# Patient Record
Sex: FEMALE
[De-identification: names, ages, dates, MRNs, and addresses within clinical notes are randomized per-mention and may not be internally consistent; named-entity substitution may affect disease eponyms.]

---

## 2020-05-14 PROBLEM — Z00.00 ENCOUNTER FOR PREVENTIVE HEALTH EXAMINATION: Status: ACTIVE | Noted: 2020-05-14

## 2020-05-15 ENCOUNTER — APPOINTMENT (OUTPATIENT)
Dept: ORTHOPEDIC SURGERY | Facility: CLINIC | Age: 65
End: 2020-05-15
Payer: MEDICARE

## 2020-05-15 DIAGNOSIS — Z80.8 FAMILY HISTORY OF MALIGNANT NEOPLASM OF OTHER ORGANS OR SYSTEMS: ICD-10-CM

## 2020-05-15 DIAGNOSIS — G43.909 MIGRAINE, UNSPECIFIED, NOT INTRACTABLE, W/OUT STATUS MIGRAINOSUS: ICD-10-CM

## 2020-05-15 DIAGNOSIS — M75.02 ADHESIVE CAPSULITIS OF LEFT SHOULDER: ICD-10-CM

## 2020-05-15 DIAGNOSIS — M25.512 PAIN IN LEFT SHOULDER: ICD-10-CM

## 2020-05-15 DIAGNOSIS — Z78.9 OTHER SPECIFIED HEALTH STATUS: ICD-10-CM

## 2020-05-15 DIAGNOSIS — Z80.0 FAMILY HISTORY OF MALIGNANT NEOPLASM OF DIGESTIVE ORGANS: ICD-10-CM

## 2020-05-15 DIAGNOSIS — M19.049 PRIMARY OSTEOARTHRITIS, UNSPECIFIED HAND: ICD-10-CM

## 2020-05-15 DIAGNOSIS — Z80.3 FAMILY HISTORY OF MALIGNANT NEOPLASM OF BREAST: ICD-10-CM

## 2020-05-15 PROCEDURE — 99203 OFFICE O/P NEW LOW 30 MIN: CPT | Mod: 95

## 2020-05-15 RX ORDER — ESTRADIOL 0.03 MG/D
0.03 PATCH, EXTENDED RELEASE TRANSDERMAL
Refills: 0 | Status: ACTIVE | COMMUNITY

## 2020-05-15 RX ORDER — PROGESTERONE 100 MG/1
100 CAPSULE ORAL
Refills: 0 | Status: ACTIVE | COMMUNITY

## 2020-05-15 RX ORDER — SUMATRIPTAN SUCCINATE AND NAPROXEN SODIUM 85; 500 MG/1; MG/1
85-500 TABLET, FILM COATED ORAL
Refills: 0 | Status: ACTIVE | COMMUNITY

## 2020-05-15 NOTE — ASSESSMENT
[FreeTextEntry1] : 66 yo woman with 2 mos Left shoulder pain that started without any specific trauma but she normally does a lot of lifting and physical activity. She appears to have painful , global loss of motion in the left shoulder which would be c/w adhesive capsulitis.\par Differential dx also includes RC tendinosis, bursitis, calcific tendinitis and osteoarthritis. We disc'd the possibilities and will need to see how her symptoms evolve over time\par I suggested first trying a course of Naproxyn 550 mg BID for 1 wk w/ food and then 1/day for another week and then take as needed.\par She will do home assisted ROM exercises, heat and ice. \par If it isn't getting better she can go for an x-ray or come into the office for x-ray and eval. \par We may consider MRI and / or injection. \par She will be given a PT script as well.\par

## 2020-05-15 NOTE — PHYSICAL EXAM
[Normal RUE] : Right Upper Extremity: No scars, rashes, lesions, ulcers, skin intact [UE] : Sensory: Intact in bilateral upper extremities [Normal LUE] : Left Upper Extremity: No scars, rashes, lesions, ulcers, skin intact [Normal Touch] : sensation intact for touch [Normal] : Oriented to person, place, and time, insight and judgement were intact and the affect was normal [de-identified] : Bilateral shoulders \par Cervical ROM is within normal limits and without pain.\par Normal appearance shoulders. \par AROM: 180 RT and 160 LT FE, IR to T6 RT and L3 LT , 180 RT and 150 LT abd.\par RT 90  LT 50 ER and 60 RT and 10 LT  IR in the 90 degree abducted position.\par LEFT + Neer, + Abebe. + X-arm.   Pain at extremes of all motion on the Left . No pain with ROM/movement RIGHT\par Full symmetric AROM elbows and hands.\par Sensation is intact distally in the UE.\par Skin is intact in the UE. \par Intact Motor distally.\par No edema, ecchymoses erythema [de-identified] : No respiratory distress or coughing

## 2020-05-17 ENCOUNTER — FORM ENCOUNTER (OUTPATIENT)
Age: 65
End: 2020-05-17

## 2021-05-28 ENCOUNTER — APPOINTMENT (OUTPATIENT)
Dept: ORTHOPEDIC SURGERY | Facility: CLINIC | Age: 66
End: 2021-05-28
Payer: MEDICARE

## 2021-05-28 PROCEDURE — 20610 DRAIN/INJ JOINT/BURSA W/O US: CPT | Mod: RT

## 2021-05-28 PROCEDURE — 73564 X-RAY EXAM KNEE 4 OR MORE: CPT | Mod: RT

## 2021-05-28 PROCEDURE — 99213 OFFICE O/P EST LOW 20 MIN: CPT | Mod: 25

## 2021-06-01 NOTE — PHYSICAL EXAM
[Normal RUE] : Right Upper Extremity: No scars, rashes, lesions, ulcers, skin intact [Normal LUE] : Left Upper Extremity: No scars, rashes, lesions, ulcers, skin intact [Normal Touch] : sensation intact for touch [Normal] : Oriented to person, place, and time, insight and judgement were intact and the affect was normal [LE] : Sensory: Intact in bilateral lower extremities [PT] : posterior tibial 2+ and symmetric bilaterally [Knee Swelling Right] : swelling [Knee Tenderness On Palpation Right] : tenderness [Knee Meniscal Integ Brijesh's Displace Test Right Lateral] : positive Brijesh's laterally [Knee Motion Left] : full ROM [Normal RLE] : Right Lower Extremity: No scars, rashes, lesions, ulcers, skin intact [Normal LLE] : Left Lower Extremity: No scars, rashes, lesions, ulcers, skin intact [Knee Motion Right Crepitus] : no crepitus with ROM [Knee Motion Right] : limited ROM [Knee Stability / Maneuvers] : negative patellofemoral apprehension test [Knee Anterior Drawer Sign Right] : negative anterior drawer sign [Knee Posterior Drawer Sign Right] : negative posterior drawer sign [Knee Meniscal Integ Brijesh's Displace Test Right Medial] : negative Brijesh's medially [Knee Instability Laxity Right Anterior Cruciate Ligament] : negative pivot shift test [Knee Medial Instability Right] : no laxity on valgus stress [Knee Lateral Instability Right] : no laxity on varus stress [Knee Swelling Left] : no swelling [Knee Tenderness On Palpation Left] : no tenderness [Knee Motion Left Crepitus] : no crepitus with ROM [Patellofemoral Apprehension Test Left] : negative patellofemoral apprehension test [Knee Anterior Drawer Sign Left] : negative anterior drawer sign [Knee Posterior Drawer Sign Left] : negative posterior drawer sign [Knee Meniscal Integ Brijesh's Displace Test Left Medial] : negative Brijesh's medially [Knee Meniscal Integ Brijesh's Displace Test Left Lateral] : negative Brijesh's laterally [Knee Tender On Palp With Quadriceps Contracted (Shrug Sign)] : negative patellar grind [Knee Instability Laxity Left Anterior Cruciate Ligament] : negative pivot shift test [Knee Medial Instability Left] : no laxity on valgus stress [Knee Lateral Instability Left] : no  laxity on varus stress [Obese] : not obese [FreeTextEntry2] : \par Right knee range of motion is with a 5 to 10 degree flexion contracture with pain on attempted extension and flexion to about 110 to 115 degrees with pain on further flexion.\par Left knee motion is from 0 to 130 degrees.\par Tender lateral joint line and more so on the patella facets of the right knee.\par 1+ effusion. [de-identified] : No respiratory distress or coughing [de-identified] : \par X-rays right knee weightbearing 4 views were ordered and performed today.  They were reviewed with the patient showing mild patellofemoral osteoarthritis.  Medial and lateral compartments well-maintained.  No fractures.  There may be some subtle subchondral lucency across the lateral femoral condyle

## 2021-06-01 NOTE — PROCEDURE
[Aspiration] : Aspiration [Injection] : Injection [Right] : of the right [Knee Joint] : knee joint [Effusion] : Effusion [Osteoarthritis] : Osteoarthritis [Joint Pain] : Joint pain [Patient] : patient [Risk] : risk [Benefits] : benefits [Alternatives] : alternatives [Bleeding] : bleeding [Infection] : infection [Allergic Reaction] : allergic reaction [Verbal Consent Obtained] : verbal consent was obtained prior to the procedure [Alcohol] : Alcohol [Betadine] : Betadine [Ethyl Chloride Spray] : ethyl chloride spray was used as a topical anesthetic [Lateral] : lateral [20] : a 20-gauge [___ mL Fluid] : [unfilled] mL of [Same Needle/New Syringe] : the syringe was changed and the same needle was left in place and [1% Lidocaine___(mL)] : [unfilled] mL of 1% Lidocaine [Methylpred. 40mg/mL___(mL)] : [unfilled] mL of 40mg/mL methylprednisolone [Bandage Applied] : a bandage [Tolerated Well] : The patient tolerated the procedure well [None] : none [No Strenuous Activity___day(s)] : avoid strenuous activity for [unfilled] day(s) [___ Week(s)] : in [unfilled] week(s) [de-identified] : serosanguinous

## 2021-06-01 NOTE — ASSESSMENT
[FreeTextEntry1] : 67 yo with acute right knee pain over the last 4 weeks or so.  There is swelling.  There was no specific trauma but perhaps a lot of overuse.\par I recommended aspiration and steroid injection today since she cannot take NSAIDs and pain is severe and persisting.\par We did that and after the injection she had good partial pain relief and could extend the knee almost fully and could flex further to about 125 degrees.  There is still some pain at extremes of motion.  She could also walk better.  She was given a cane to assist in ambulation and keep stress off the knee.  She was given home exercises to do that are gentle but strengthening.  She should do physical therapy.  Ice regularly and Tylenol.\par If it is not getting better she will go for an MRI in a few weeks.\par Follow-up in about 4 weeks to check and make sure it is improving.\par If she has any bad reaction to the steroid she should let me know.  Occasionally patients have a steroid flare for a day afterwards I warned her of.

## 2021-06-01 NOTE — HISTORY OF PRESENT ILLNESS
[de-identified] : Ms. 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  She just noticed pain in the knee without any injury but she had been very busy and doing a lot of work on her feet for her business.\par She tried to rest more and wore a knee sleeve and took Tylenol up to 4/day.  Pain can be severe, 8 out of 10 in severity.  Walking barefoot makes it worse.  She has been limping.  There is pain at night.  Pain started radiating up the right lateral thigh to the lateral hip area.  Occasionally it radiates distally.  No prior knee issues.\par He states that her knee did go straight before this episode of pain.\par I had seen her with pain in her shoulder a year ago virtually.  She went for physical therapy.  It got better quickly.

## 2021-08-02 ENCOUNTER — NON-APPOINTMENT (OUTPATIENT)
Age: 66
End: 2021-08-02

## 2021-08-03 ENCOUNTER — APPOINTMENT (OUTPATIENT)
Dept: ORTHOPEDIC SURGERY | Facility: CLINIC | Age: 66
End: 2021-08-03
Payer: MEDICARE

## 2021-08-03 DIAGNOSIS — M25.461 EFFUSION, RIGHT KNEE: ICD-10-CM

## 2021-08-03 DIAGNOSIS — S83.281A OTHER TEAR OF LATERAL MENISCUS, CURRENT INJURY, RIGHT KNEE, INITIAL ENCOUNTER: ICD-10-CM

## 2021-08-03 PROCEDURE — 99213 OFFICE O/P EST LOW 20 MIN: CPT

## 2021-08-03 NOTE — ASSESSMENT
[FreeTextEntry1] : 66-year-old with right knee pain who has moderate patellofemoral arthritis and had edema in the lateral femoral condyle consistent with an insufficiency fracture and bone bruising medially.\par I recommended rest and walking with a cane limiting her weightbearing activity.  She can do straight leg raises.  Heat and ice.  She should take calcium and vitamin D.  We could consider an  brace but given the medial edema I think that may actually aggravate the contralateral side and would try to avoid an .\par She should work on full extension exercises.  If she still has pain walking with a cane in another week then a walker or crutches would be helpful.  She should not carry any heavy bags or objects.  Her knee is already looking better compared to the last visit with less swelling and pain.\par Follow-up in about 3 to 4 weeks.

## 2021-08-03 NOTE — HISTORY OF PRESENT ILLNESS
[de-identified] : Ms. Dejesus comes in for f/u for pain in her right knee that started a couple mos ago without any injury but she is active. \par She had the MRI. \par She has been walking with a cane.  She has been really resting especially of the last several days which has helped somewhat.  Line she started medication for the osteoporosis.

## 2021-08-03 NOTE — PHYSICAL EXAM
[LE] : Sensory: Intact in bilateral lower extremities [PT] : posterior tibial 2+ and symmetric bilaterally [Knee Swelling Right] : swelling [Knee Tenderness On Palpation Right] : tenderness [Knee Motion Left] : full ROM [Normal RUE] : Right Upper Extremity: No scars, rashes, lesions, ulcers, skin intact [Normal LUE] : Left Upper Extremity: No scars, rashes, lesions, ulcers, skin intact [Normal RLE] : Right Lower Extremity: No scars, rashes, lesions, ulcers, skin intact [Normal LLE] : Left Lower Extremity: No scars, rashes, lesions, ulcers, skin intact [Normal Touch] : sensation intact for touch [Normal] : Oriented to person, place, and time, insight and judgement were intact and the affect was normal [Knee Motion Right Crepitus] : no crepitus with ROM [Knee Motion Right] : limited ROM [Knee Stability / Maneuvers] : negative patellofemoral apprehension test [Knee Anterior Drawer Sign Right] : negative anterior drawer sign [Knee Posterior Drawer Sign Right] : negative posterior drawer sign [Knee Meniscal Integ Brijesh's Displace Test Right Medial] : negative Brijesh's medially [Knee Meniscal Integ Brijesh's Displace Test Right Lateral] : negative Brijesh's laterally [Knee Instability Laxity Right Anterior Cruciate Ligament] : negative pivot shift test [Knee Medial Instability Right] : no laxity on valgus stress [Knee Lateral Instability Right] : no laxity on varus stress [Knee Swelling Left] : no swelling [Knee Tenderness On Palpation Left] : no tenderness [Knee Motion Left Crepitus] : no crepitus with ROM [Patellofemoral Apprehension Test Left] : negative patellofemoral apprehension test [Knee Anterior Drawer Sign Left] : negative anterior drawer sign [Knee Posterior Drawer Sign Left] : negative posterior drawer sign [Knee Meniscal Integ Brijesh's Displace Test Left Medial] : negative Brijesh's medially [Knee Meniscal Integ Brijesh's Displace Test Left Lateral] : negative Brijesh's laterally [Knee Tender On Palp With Quadriceps Contracted (Shrug Sign)] : negative patellar grind [Knee Instability Laxity Left Anterior Cruciate Ligament] : negative pivot shift test [Knee Medial Instability Left] : no laxity on valgus stress [Knee Lateral Instability Left] : no  laxity on varus stress [Obese] : not obese [de-identified] : Initially when walking she was walking on a flexed knee and quite awkwardly.  I encouraged her to try to walk normally but use the cane to get a little pressure off and then she walked better. [FreeTextEntry2] : \par Right knee range of motion is with a 5  degree flexion contracture with mild but less pain on attempted extension and flexion to about 125 degrees with pain on further flexion.\par Left knee motion is from 0 to 130 degrees.\par Tender mildly  lateral joint line and more so on the patella facets of the right knee.\par trace+ effusion. [de-identified] : No respiratory distress or coughing [de-identified] : \par X-rays right knee weightbearing 4 views were done May 28, 2021  showing mild patellofemoral osteoarthritis.  Medial and lateral compartments well-maintained.  No fractures.  There may be some subtle subchondral lucency across the lateral femoral condyle\par \par AP and lateral x-rays of the right knee today showed\par \par MRI of the right knee performed July 16, 2021 showed edema in the lateral femoral condyle consistent with an insufficiency fracture central to posterior .\par There was broad full-thickness cartilage loss in the central medial patella facet and degenerative subchondral bone marrow edema and high-grade thinning of the cartilage inferior and lateral.\par There is a mild bone contusion medial femoral condyle.

## 2021-09-01 ENCOUNTER — APPOINTMENT (OUTPATIENT)
Dept: ORTHOPEDIC SURGERY | Facility: CLINIC | Age: 66
End: 2021-09-01
Payer: MEDICARE

## 2021-09-01 DIAGNOSIS — M84.451A PATHOLOGICAL FRACTURE, RIGHT FEMUR, INITIAL ENCOUNTER FOR FRACTURE: ICD-10-CM

## 2021-09-01 DIAGNOSIS — M17.11 UNILATERAL PRIMARY OSTEOARTHRITIS, RIGHT KNEE: ICD-10-CM

## 2021-09-01 PROCEDURE — 99213 OFFICE O/P EST LOW 20 MIN: CPT

## 2021-09-01 PROCEDURE — 73564 X-RAY EXAM KNEE 4 OR MORE: CPT | Mod: LT

## 2021-09-01 NOTE — ASSESSMENT
[FreeTextEntry1] : 66-year-old with right knee pain who has moderate patellofemoral arthritis and had edema in the lateral femoral condyle consistent with an insufficiency fracture and bone bruising medially.\par Her knee pain has decreased considerably with the decrease in activity and not aggravating it.  She should continue to use a cane.  She may be able to do a little more activity but should still keep it down to just which she needs to do.  She should continue to do leg lifts and nonweightbearing exercises to keep the muscle strong.  She is seeing an endocrinologist for treatment of the osteoporosis and is going to be starting on medication for the osteoporosis.\par Talked about and  brace is another option but may be uncomfortable.  She does not have any significant valgus in her knee.\par Follow-up in about 4 weeks

## 2021-09-01 NOTE — HISTORY OF PRESENT ILLNESS
[de-identified] : Ms. Dejesus comes in for f/u for pain in her right knee that started a couple mos ago without any injury but she is active. \par She had an MRI 7/16/21 showing insufficiency fx MFC\par She has been walking with a cane.  She has continued to minimize her activities and not do too much lifting or carrying.  Pain is nowhere near what it was when I had seen her initially but has not quite gone away completely either.  She has not been taking any medication for pain.

## 2021-09-01 NOTE — PHYSICAL EXAM
[LE] : Sensory: Intact in bilateral lower extremities [PT] : posterior tibial 2+ and symmetric bilaterally [Knee Tenderness On Palpation Right] : tenderness [Knee Motion Left] : full ROM [Normal RUE] : Right Upper Extremity: No scars, rashes, lesions, ulcers, skin intact [Normal LUE] : Left Upper Extremity: No scars, rashes, lesions, ulcers, skin intact [Normal RLE] : Right Lower Extremity: No scars, rashes, lesions, ulcers, skin intact [Normal LLE] : Left Lower Extremity: No scars, rashes, lesions, ulcers, skin intact [Normal Touch] : sensation intact for touch [Normal] : Gait: normal [Knee Swelling Right] : no swelling [Knee Motion Right Crepitus] : no crepitus with ROM [Knee Motion Right] : limited ROM [Knee Stability / Maneuvers] : negative patellofemoral apprehension test [Knee Anterior Drawer Sign Right] : negative anterior drawer sign [Knee Posterior Drawer Sign Right] : negative posterior drawer sign [Knee Meniscal Integ Brijesh's Displace Test Right Medial] : negative Brijesh's medially [Knee Meniscal Integ Brijesh's Displace Test Right Lateral] : negative Brijesh's laterally [Knee Instability Laxity Right Anterior Cruciate Ligament] : negative pivot shift test [Knee Medial Instability Right] : no laxity on valgus stress [Knee Lateral Instability Right] : no laxity on varus stress [Knee Swelling Left] : no swelling [Knee Tenderness On Palpation Left] : no tenderness [Knee Motion Left Crepitus] : no crepitus with ROM [Patellofemoral Apprehension Test Left] : negative patellofemoral apprehension test [Knee Anterior Drawer Sign Left] : negative anterior drawer sign [Knee Posterior Drawer Sign Left] : negative posterior drawer sign [Knee Meniscal Integ Brijesh's Displace Test Left Medial] : negative Brijesh's medially [Knee Meniscal Integ Brijesh's Displace Test Left Lateral] : negative Brijesh's laterally [Knee Tender On Palp With Quadriceps Contracted (Shrug Sign)] : negative patellar grind [Knee Instability Laxity Left Anterior Cruciate Ligament] : negative pivot shift test [Knee Medial Instability Left] : no laxity on valgus stress [Knee Lateral Instability Left] : no  laxity on varus stress [Obese] : not obese [FreeTextEntry2] : \par Right knee range of motion is with a 2  degree flexion contracture  to about 125-130 degrees  flexion with discomfort on full flexion but improved\par Left knee motion is from 0 to 130 degrees.\par Tender mildly  lateral joint line and more so on the patella facets of the right knee.\par - effusion. [de-identified] : No respiratory distress or coughing [de-identified] : \par X-rays right knee weightbearing 4 views were done May 28, 2021 showing mild patellofemoral osteoarthritis.  Medial and lateral compartments well-maintained.  No fractures.  There may be some subtle subchondral lucency across the lateral femoral condyle\par \par  x-rays of the right knee WB 4 views today do not show any fracture but there appears to be some osteopenia in the lateral femoral condyle.  No collapse or fracture\par \par MRI of the right knee performed July 16, 2021 showed edema in the lateral femoral condyle consistent with an insufficiency fracture central to posterior .\par There was broad full-thickness cartilage loss in the central medial patella facet and degenerative subchondral bone marrow edema and high-grade thinning of the cartilage inferior and lateral.\par There is a mild bone contusion medial femoral condyle.

## 2021-09-30 ENCOUNTER — NON-APPOINTMENT (OUTPATIENT)
Age: 66
End: 2021-09-30

## 2021-10-05 ENCOUNTER — APPOINTMENT (OUTPATIENT)
Dept: ENDOCRINOLOGY | Facility: CLINIC | Age: 66
End: 2021-10-05
Payer: MEDICARE

## 2021-10-05 ENCOUNTER — NON-APPOINTMENT (OUTPATIENT)
Age: 66
End: 2021-10-05

## 2021-10-05 VITALS
BODY MASS INDEX: 21.52 KG/M2 | DIASTOLIC BLOOD PRESSURE: 70 MMHG | HEIGHT: 61 IN | WEIGHT: 114 LBS | SYSTOLIC BLOOD PRESSURE: 121 MMHG | HEART RATE: 71 BPM

## 2021-10-05 DIAGNOSIS — M81.0 AGE-RELATED OSTEOPOROSIS W/OUT CURRENT PATHOLOGICAL FRACTURE: ICD-10-CM

## 2021-10-05 PROCEDURE — 99204 OFFICE O/P NEW MOD 45 MIN: CPT

## 2021-10-05 RX ORDER — OXCARBAZEPINE 150 MG/1
TABLET, FILM COATED ORAL
Refills: 0 | Status: ACTIVE | COMMUNITY

## 2021-10-05 RX ORDER — CALCIUM CITRATE/VITAMIN D3 315MG-6.25
TABLET ORAL
Refills: 0 | Status: ACTIVE | COMMUNITY

## 2021-10-05 RX ORDER — TOPIRAMATE 50 MG/1
TABLET, COATED ORAL
Refills: 0 | Status: DISCONTINUED | COMMUNITY
End: 2021-10-05

## 2021-10-05 NOTE — HISTORY OF PRESENT ILLNESS
[FreeTextEntry1] : Ms. Dejesus is a 66 year-old woman presenting for a second opinion regarding osteoporosis management. \par \par Bone History\par Menopause: Around age 52\par Osteoporosis diagnosed in 2018 on routine bone density significant for T-scores of -3.3 at the lumbar spine, -3.5 at the femoral neck, -2.4 at the total hip; bone density testing performed this year but not available\par Fracture history: Left lateral femoral subchondral fracture in April 2021 after lifting heavy books upstairs\par Family history: Mother with history of hip fracture\par Treatment: \par Estradiol 0.025 mg patch and progesterone 200 mg 10 days of the month; on hormone therapy since around age 52 to present with no plan to discontinue\par Romosozumab 210 mg SC monthly in July, August, and September 2021 through Dr. Chidi Smart\par \par Falls: No\par Height loss: No\par Kidney stones: Presumptive history of nephrolithiasis; no history of hypercalciuria\par Dental health: Regular appointments, no history of implants, no upcoming procedures planned although will need wisdom tooth extraction in the future\par Exercise: Active at work\par Dairy intake: Rare; half and half in coffee, rare cheese\par Calcium supplements: Pro [place] calcium 500 mg twice daily\par Multivitamin: None\par Vitamin D supplements: 5000 intl units twice daily - started in July 2021\par \par Osteoporosis risk factors include: Postmenopausal status,  race, prior fracture, falls, height loss, small thin bones, tobacco use, excessive alcohol, anorexia, family history, vitamin D deficiency, corticosteroid use, seizure medications, malabsorption, hyperparathyroidism, hyperthyroidism.\par NEGATIVE EXCEPT: Postmenopausal status,  race, prior fracture, family history

## 2021-10-05 NOTE — DATA REVIEWED
[FreeTextEntry1] : Laboratories (July 28, 2021) reviewed and significant for: \par Calcium 9.7 mg/dL (albumin 4.4 g/dL)\par BUN/creatinine 11/0.81 mg/dL (eGFR 76 mL/min)\par Alkaline phosphatase 69 U/L\par 25-hydroxyvitamin D 24 ng/mL\par \par Most recent bone mineral density\par Date: June 26, 2018\par Source: Hologic\par Site: [facility]\par \par Site	BMD (g/cm2)	T-score	Change previous	Change baseline	\par Lumbar spine	0.680	-3.3\par Femoral neck	0.458	-3.5	\par Total hip	                0.651       -2.4         \par Distal radius           	                \par DXA Comments: Right hip values

## 2021-10-05 NOTE — ASSESSMENT
[FreeTextEntry1] : Osteoporosis. She has a history of femoral subchondral fracture but no history of fragility fracture. She has been treated with romosozumab from July 2021 to present. We discussed completion of a metabolic evaluation for secondary causes of bone loss. We discussed the potential benefits and risks of the osteoanabolic and antiresorptive classes of pharmacologic osteoporosis therapy. We discussed the potential benefits and risks of romosozumab at length, including but not limited to osteonecrosis of the jaw and atypical femoral fracture. She is tolerating romosozumab and I recommend she continue to complete a 12 month course. We discussed the need for antiresorptive therapy after a course of osteoanabolic therapy so she does not lose the bone that was gained with treatment. \par Agree with romosozumab therapy to complete a 12 month course followed by antiresorptive therapy\par Metabolic evaluation for secondary causes of osteoporosis\par Calcium 1200 mg daily from diet and supplements (to be taken in divided doses as no more than 500-600 mg can be absorbed at one time); continue current regimen\par Advised decrease in vitamin D supplementation to 10,000 intl units weekly\par Diet, exercise and fall prevention discussed\par \par I reviewed the DXA performed on June 26, 2018 with the patient today.\par I reviewed the laboratories performed on July 28, 2021 with the patient today. \par I counseled the patient regarding calcium and vitamin D intake today.\par I discussed the following osteoporosis therapies: Alendronate, risedronate, ibandronate, zoledronic acid, denosumab, romosozumab

## 2021-10-05 NOTE — PHYSICAL EXAM
[Alert] : alert [Healthy Appearance] : healthy appearance [No Acute Distress] : no acute distress [Normal Sclera/Conjunctiva] : normal sclera/conjunctiva [Normal Hearing] : hearing was normal [No Neck Mass] : no neck mass was observed [No LAD] : no lymphadenopathy [Supple] : the neck was supple [Thyroid Not Enlarged] : the thyroid was not enlarged [No Respiratory Distress] : no respiratory distress [Clear to Auscultation] : lungs were clear to auscultation bilaterally [Normal S1, S2] : normal S1 and S2 [Normal Rate] : heart rate was normal [Regular Rhythm] : with a regular rhythm [No Spinal Tenderness] : no spinal tenderness [No Stigmata of Cushings Syndrome] : no stigmata of Cushings Syndrome [Normal Gait] : normal gait [Normal Insight/Judgement] : insight and judgment were intact [Kyphosis] : no kyphosis present [Scoliosis] : no scoliosis [Acanthosis Nigricans] : no acanthosis nigricans [de-identified] : no moon facies, no supraclavicular fat pads

## 2022-04-07 ENCOUNTER — APPOINTMENT (OUTPATIENT)
Dept: ORTHOPEDIC SURGERY | Facility: CLINIC | Age: 67
End: 2022-04-07
Payer: MEDICARE

## 2022-04-07 VITALS — WEIGHT: 114 LBS | HEIGHT: 61 IN | RESPIRATION RATE: 16 BRPM | BODY MASS INDEX: 21.52 KG/M2

## 2022-04-07 DIAGNOSIS — M19.031 PRIMARY OSTEOARTHRITIS, RIGHT WRIST: ICD-10-CM

## 2022-04-07 DIAGNOSIS — M19.032 PRIMARY OSTEOARTHRITIS, LEFT WRIST: ICD-10-CM

## 2022-04-07 PROCEDURE — 73110 X-RAY EXAM OF WRIST: CPT | Mod: 50

## 2022-04-07 PROCEDURE — 99203 OFFICE O/P NEW LOW 30 MIN: CPT

## 2023-01-05 ENCOUNTER — TRANSCRIPTION ENCOUNTER (OUTPATIENT)
Age: 68
End: 2023-01-05

## 2023-12-14 ENCOUNTER — APPOINTMENT (OUTPATIENT)
Dept: OTOLARYNGOLOGY | Facility: CLINIC | Age: 68
End: 2023-12-14
Payer: MEDICARE

## 2023-12-14 VITALS — BODY MASS INDEX: 23.6 KG/M2 | HEIGHT: 61 IN | WEIGHT: 125 LBS

## 2023-12-14 DIAGNOSIS — J31.0 CHRONIC RHINITIS: ICD-10-CM

## 2023-12-14 DIAGNOSIS — Z78.9 OTHER SPECIFIED HEALTH STATUS: ICD-10-CM

## 2023-12-14 DIAGNOSIS — J34.2 DEVIATED NASAL SEPTUM: ICD-10-CM

## 2023-12-14 DIAGNOSIS — M95.0 ACQUIRED DEFORMITY OF NOSE: ICD-10-CM

## 2023-12-14 DIAGNOSIS — Z86.39 PERSONAL HISTORY OF OTHER ENDOCRINE, NUTRITIONAL AND METABOLIC DISEASE: ICD-10-CM

## 2023-12-14 PROCEDURE — 99203 OFFICE O/P NEW LOW 30 MIN: CPT | Mod: 25

## 2023-12-14 PROCEDURE — 31231 NASAL ENDOSCOPY DX: CPT

## 2023-12-14 RX ORDER — PANTOPRAZOLE SODIUM 40 MG/1
40 TABLET, DELAYED RELEASE ORAL
Refills: 0 | Status: ACTIVE | COMMUNITY

## 2023-12-14 NOTE — HISTORY OF PRESENT ILLNESS
[de-identified] : CHUCHO SEBASTIAN is a 68 year old patient referred by Dr. Katz For chronic rhinitis.  She has bilateral nasal congestion and obstruction.  She started noticing it during the pandemic when she was wearing a mask.  It is not seasonal.  It alternates sides.  She has no sinus pain or pressure.  No history of recurrent sinusitis or allergies No history of nasal trauma or nasal/sinus surgery She does not have any pets at home.  She does not smoke tobacco but does smoke marijuana She said an MRI of the brain showed a deviated septum She has not tried any medications

## 2023-12-14 NOTE — CONSULT LETTER
[Dear  ___] : Dear  [unfilled], [Consult Letter:] : I had the pleasure of evaluating your patient, [unfilled]. [Please see my note below.] : Please see my note below. [Consult Closing:] : Thank you very much for allowing me to participate in the care of this patient.  If you have any questions, please do not hesitate to contact me. [Sincerely,] : Sincerely, [FreeTextEntry3] : Melida Nina MD [facility] at [facility] Otolaryngology  Head & Neck Surgery [facility]   Department of Otolaryngology [facility] [employer] [facility] at [facility]  Office Tel: [phone]

## 2023-12-14 NOTE — ASSESSMENT
[FreeTextEntry1] : She has a history of chronic rhinitis.  On exam, she has a deviated septum with a spur to the right, inferior turbinate hypertrophy, and mild nasal valve collapse.  She does not suffer from allergies or recurrent sinus infections  Plan -Findings and management options were discussed with the patient. -Nasal saline irrigations  -trial of a nasal steroid spray -Trial of Breathe Right strips or nasal cones -Continue treatment of reflux She may consider nasal procedures to improve her breathing.  I discussed-office procedure such as inferior turbinate reduction and nasal valve remodeling versus surgery in the operating room which would include septoplasty, inferior turbinate reduction, and possible nasal valve repair -I am going to see her back in 3 to 4 weeks -She will call and return earlier if she has any problems

## 2023-12-14 NOTE — PHYSICAL EXAM
[TextEntry] : PHYSICAL EXAM  General: The patient was alert, oriented and in no distress. Voice was clear.  Face: The patient had no facial asymmetry or mass. The skin was unremarkable.  Ears: Hearing normal to conversational voice External ears were normal without deformity. Ear canals were clear. No cerumen or inflammation Tympanic membranes were intact and normal. No perforation or effusion. mobile  Nose:  The external nose had no significant deformity.     On anterior rhinoscopy, the nasal mucosa was clear.   The anterior septum was grossly midline. There were no visualized polyps, purulence  or masses.  Monty maneuver showed mild nasal valve collapse  Oral cavity: Oral mucosa- normal. Oral and base of tongue- clear and without mass. Gingival and buccal mucosa- moist and without lesions. Palate- the palate moved well. There was no cleft palate. There appeared to be good salivary flow.   Oral cavity/oropharynx- no pus, erythema or mass  Neck:  The neck was symmetrical. The parotid and submandibular glands were normal without masses. The trachea was midline and there was no unusual crepitus. Thyroid was smooth and nontender and no masses were palpated. No masses  Lymphatics: Cervical adenopathy- none.

## 2024-02-01 ENCOUNTER — OUTPATIENT (OUTPATIENT)
Dept: OUTPATIENT SERVICES | Facility: HOSPITAL | Age: 69
LOS: 1 days | End: 2024-02-01

## 2024-02-01 ENCOUNTER — APPOINTMENT (OUTPATIENT)
Dept: ULTRASOUND IMAGING | Facility: CLINIC | Age: 69
End: 2024-02-01
Payer: MEDICARE

## 2024-02-01 PROCEDURE — 76700 US EXAM ABDOM COMPLETE: CPT | Mod: 26

## 2024-02-01 PROCEDURE — 76857 US EXAM PELVIC LIMITED: CPT | Mod: 26
